# Patient Record
Sex: MALE | Race: WHITE | NOT HISPANIC OR LATINO | Employment: UNEMPLOYED | URBAN - METROPOLITAN AREA
[De-identification: names, ages, dates, MRNs, and addresses within clinical notes are randomized per-mention and may not be internally consistent; named-entity substitution may affect disease eponyms.]

---

## 2020-07-24 ENCOUNTER — APPOINTMENT (OUTPATIENT)
Dept: GENERAL RADIOLOGY | Facility: HOSPITAL | Age: 35
End: 2020-07-24

## 2020-07-24 ENCOUNTER — APPOINTMENT (OUTPATIENT)
Dept: CT IMAGING | Facility: HOSPITAL | Age: 35
End: 2020-07-24

## 2020-07-24 ENCOUNTER — HOSPITAL ENCOUNTER (EMERGENCY)
Facility: HOSPITAL | Age: 35
Discharge: LEFT BEFORE DISCHARGE | End: 2020-07-24
Attending: EMERGENCY MEDICINE | Admitting: EMERGENCY MEDICINE

## 2020-07-24 VITALS
RESPIRATION RATE: 18 BRPM | HEART RATE: 130 BPM | SYSTOLIC BLOOD PRESSURE: 128 MMHG | OXYGEN SATURATION: 97 % | DIASTOLIC BLOOD PRESSURE: 81 MMHG | TEMPERATURE: 101.3 F | WEIGHT: 210 LBS | HEIGHT: 69 IN | BODY MASS INDEX: 31.1 KG/M2

## 2020-07-24 DIAGNOSIS — R50.9 FEVER, UNSPECIFIED FEVER CAUSE: ICD-10-CM

## 2020-07-24 DIAGNOSIS — R00.0 TACHYCARDIA: ICD-10-CM

## 2020-07-24 DIAGNOSIS — L03.116 CELLULITIS OF LEFT HIP: ICD-10-CM

## 2020-07-24 DIAGNOSIS — E87.1 HYPONATREMIA: ICD-10-CM

## 2020-07-24 DIAGNOSIS — R73.9 HYPERGLYCEMIA: ICD-10-CM

## 2020-07-24 DIAGNOSIS — A41.9 SEPSIS, DUE TO UNSPECIFIED ORGANISM, UNSPECIFIED WHETHER ACUTE ORGAN DYSFUNCTION PRESENT (HCC): Primary | ICD-10-CM

## 2020-07-24 DIAGNOSIS — D72.829 LEUKOCYTOSIS, UNSPECIFIED TYPE: ICD-10-CM

## 2020-07-24 LAB
ALBUMIN SERPL-MCNC: 4.3 G/DL (ref 3.5–5.2)
ALBUMIN/GLOB SERPL: 1 G/DL
ALP SERPL-CCNC: 69 U/L (ref 39–117)
ALT SERPL W P-5'-P-CCNC: 14 U/L (ref 1–41)
ANION GAP SERPL CALCULATED.3IONS-SCNC: 11.9 MMOL/L (ref 5–15)
APTT PPP: 31.6 SECONDS (ref 22.7–35.4)
AST SERPL-CCNC: 10 U/L (ref 1–40)
BASOPHILS # BLD AUTO: 0.08 10*3/MM3 (ref 0–0.2)
BASOPHILS NFR BLD AUTO: 0.4 % (ref 0–1.5)
BILIRUB SERPL-MCNC: 1.3 MG/DL (ref 0–1.2)
BUN SERPL-MCNC: 10 MG/DL (ref 6–20)
BUN/CREAT SERPL: 10.5 (ref 7–25)
CALCIUM SPEC-SCNC: 9.8 MG/DL (ref 8.6–10.5)
CHLORIDE SERPL-SCNC: 96 MMOL/L (ref 98–107)
CO2 SERPL-SCNC: 23.1 MMOL/L (ref 22–29)
CREAT SERPL-MCNC: 0.95 MG/DL (ref 0.76–1.27)
CRP SERPL-MCNC: 13.77 MG/DL (ref 0–0.5)
D-LACTATE SERPL-SCNC: 1.5 MMOL/L (ref 0.5–2)
DEPRECATED RDW RBC AUTO: 39.6 FL (ref 37–54)
EOSINOPHIL # BLD AUTO: 0.03 10*3/MM3 (ref 0–0.4)
EOSINOPHIL NFR BLD AUTO: 0.1 % (ref 0.3–6.2)
ERYTHROCYTE [DISTWIDTH] IN BLOOD BY AUTOMATED COUNT: 12.6 % (ref 12.3–15.4)
GFR SERPL CREATININE-BSD FRML MDRD: 91 ML/MIN/1.73
GLOBULIN UR ELPH-MCNC: 4.2 GM/DL
GLUCOSE SERPL-MCNC: 120 MG/DL (ref 65–99)
HCT VFR BLD AUTO: 44.6 % (ref 37.5–51)
HGB BLD-MCNC: 15 G/DL (ref 13–17.7)
IMM GRANULOCYTES # BLD AUTO: 0.1 10*3/MM3 (ref 0–0.05)
IMM GRANULOCYTES NFR BLD AUTO: 0.5 % (ref 0–0.5)
INR PPP: 1.12 (ref 0.9–1.1)
LYMPHOCYTES # BLD AUTO: 3.54 10*3/MM3 (ref 0.7–3.1)
LYMPHOCYTES NFR BLD AUTO: 17.6 % (ref 19.6–45.3)
MCH RBC QN AUTO: 29 PG (ref 26.6–33)
MCHC RBC AUTO-ENTMCNC: 33.6 G/DL (ref 31.5–35.7)
MCV RBC AUTO: 86.3 FL (ref 79–97)
MONOCYTES # BLD AUTO: 1.79 10*3/MM3 (ref 0.1–0.9)
MONOCYTES NFR BLD AUTO: 8.9 % (ref 5–12)
NEUTROPHILS NFR BLD AUTO: 14.53 10*3/MM3 (ref 1.7–7)
NEUTROPHILS NFR BLD AUTO: 72.5 % (ref 42.7–76)
NRBC BLD AUTO-RTO: 0 /100 WBC (ref 0–0.2)
PLATELET # BLD AUTO: 331 10*3/MM3 (ref 140–450)
PMV BLD AUTO: 8.9 FL (ref 6–12)
POTASSIUM SERPL-SCNC: 3.5 MMOL/L (ref 3.5–5.2)
PROT SERPL-MCNC: 8.5 G/DL (ref 6–8.5)
PROTHROMBIN TIME: 14.2 SECONDS (ref 11.7–14.2)
RBC # BLD AUTO: 5.17 10*6/MM3 (ref 4.14–5.8)
SODIUM SERPL-SCNC: 131 MMOL/L (ref 136–145)
WBC # BLD AUTO: 20.07 10*3/MM3 (ref 3.4–10.8)

## 2020-07-24 PROCEDURE — 80053 COMPREHEN METABOLIC PANEL: CPT | Performed by: EMERGENCY MEDICINE

## 2020-07-24 PROCEDURE — 85730 THROMBOPLASTIN TIME PARTIAL: CPT | Performed by: EMERGENCY MEDICINE

## 2020-07-24 PROCEDURE — 87040 BLOOD CULTURE FOR BACTERIA: CPT | Performed by: EMERGENCY MEDICINE

## 2020-07-24 PROCEDURE — 99283 EMERGENCY DEPT VISIT LOW MDM: CPT

## 2020-07-24 PROCEDURE — 25010000002 ONDANSETRON PER 1 MG: Performed by: EMERGENCY MEDICINE

## 2020-07-24 PROCEDURE — 71045 X-RAY EXAM CHEST 1 VIEW: CPT

## 2020-07-24 PROCEDURE — 36415 COLL VENOUS BLD VENIPUNCTURE: CPT

## 2020-07-24 PROCEDURE — 25010000002 MEROPENEM PER 100 MG: Performed by: EMERGENCY MEDICINE

## 2020-07-24 PROCEDURE — 73700 CT LOWER EXTREMITY W/O DYE: CPT

## 2020-07-24 PROCEDURE — 83605 ASSAY OF LACTIC ACID: CPT | Performed by: EMERGENCY MEDICINE

## 2020-07-24 PROCEDURE — 85610 PROTHROMBIN TIME: CPT | Performed by: EMERGENCY MEDICINE

## 2020-07-24 PROCEDURE — 25010000002 MORPHINE PER 10 MG: Performed by: EMERGENCY MEDICINE

## 2020-07-24 PROCEDURE — 86140 C-REACTIVE PROTEIN: CPT | Performed by: EMERGENCY MEDICINE

## 2020-07-24 PROCEDURE — 96365 THER/PROPH/DIAG IV INF INIT: CPT

## 2020-07-24 PROCEDURE — 85025 COMPLETE CBC W/AUTO DIFF WBC: CPT | Performed by: EMERGENCY MEDICINE

## 2020-07-24 PROCEDURE — 96375 TX/PRO/DX INJ NEW DRUG ADDON: CPT

## 2020-07-24 RX ORDER — CLINDAMYCIN HYDROCHLORIDE 300 MG/1
300 CAPSULE ORAL 4 TIMES DAILY
Qty: 40 CAPSULE | Refills: 0 | Status: SHIPPED | OUTPATIENT
Start: 2020-07-24 | End: 2020-08-03

## 2020-07-24 RX ORDER — ONDANSETRON 2 MG/ML
4 INJECTION INTRAMUSCULAR; INTRAVENOUS ONCE
Status: COMPLETED | OUTPATIENT
Start: 2020-07-24 | End: 2020-07-24

## 2020-07-24 RX ORDER — ACETAMINOPHEN 500 MG
1000 TABLET ORAL ONCE
Status: DISCONTINUED | OUTPATIENT
Start: 2020-07-24 | End: 2020-07-24 | Stop reason: HOSPADM

## 2020-07-24 RX ORDER — ACETAMINOPHEN 500 MG
1000 TABLET ORAL ONCE
Status: COMPLETED | OUTPATIENT
Start: 2020-07-24 | End: 2020-07-24

## 2020-07-24 RX ORDER — CLINDAMYCIN PHOSPHATE 600 MG/50ML
600 INJECTION INTRAVENOUS ONCE
Status: DISCONTINUED | OUTPATIENT
Start: 2020-07-24 | End: 2020-07-24 | Stop reason: HOSPADM

## 2020-07-24 RX ORDER — MORPHINE SULFATE 2 MG/ML
4 INJECTION, SOLUTION INTRAMUSCULAR; INTRAVENOUS ONCE
Status: COMPLETED | OUTPATIENT
Start: 2020-07-24 | End: 2020-07-24

## 2020-07-24 RX ADMIN — MORPHINE SULFATE 4 MG: 2 INJECTION, SOLUTION INTRAMUSCULAR; INTRAVENOUS at 02:25

## 2020-07-24 RX ADMIN — SODIUM CHLORIDE 1000 ML: 9 INJECTION, SOLUTION INTRAVENOUS at 01:40

## 2020-07-24 RX ADMIN — ACETAMINOPHEN 1000 MG: 500 TABLET, FILM COATED ORAL at 01:32

## 2020-07-24 RX ADMIN — ONDANSETRON 4 MG: 2 INJECTION INTRAMUSCULAR; INTRAVENOUS at 02:23

## 2020-07-24 RX ADMIN — MEROPENEM 1 G: 1 INJECTION, POWDER, FOR SOLUTION INTRAVENOUS at 02:43

## 2020-07-24 NOTE — ED TRIAGE NOTES
Pt from home via PV for possible spider bite to L lateral thight. Pt reports it happened yesterday. States bite has worsened over night. Pt reports DC from wound is white/brown color    Pt and RN wearing mask at this time.

## 2020-07-24 NOTE — ED PROVIDER NOTES
EMERGENCY DEPARTMENT ENCOUNTER    Room Number:  16/16  Date of encounter:  7/24/2020  PCP: Provider, No Known    HPI:  Context: Ras Messina is a 35 y.o. male who presents to the ED c/o chief complaint of possible spider bite to left lateral thigh.  Patient reports first noticed a small red lesion on his lateral hip on Wednesday morning.  Area has rapidly progressed in size, began to have purulent drainage.  Patient denies any cuts or scrapes, no unusual skin lesions prior to onset of symptoms.  Patient denies any fevers, did have night sweats, no shakes or chills.  No vomiting or diarrhea.  Patient denies any history of cellulitis, abscess, or MRSA in the past. No history of diabetes, no history of chronic kidney disease, no history of HIV or immune suppression.  The patient is not on chronic steroids.  The patient is not being treated for cancer.  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    MEDICAL HISTORY REVIEW  Reviewed in EPIC    PAST MEDICAL HISTORY  Active Ambulatory Problems     Diagnosis Date Noted   • No Active Ambulatory Problems     Resolved Ambulatory Problems     Diagnosis Date Noted   • No Resolved Ambulatory Problems     No Additional Past Medical History       PAST SURGICAL HISTORY  History reviewed. No pertinent surgical history.    FAMILY HISTORY  History reviewed. No pertinent family history.    SOCIAL HISTORY  Social History     Socioeconomic History   • Marital status:      Spouse name: Not on file   • Number of children: Not on file   • Years of education: Not on file   • Highest education level: Not on file   Tobacco Use   • Smoking status: Current Every Day Smoker     Packs/day: 1.00     Types: Cigarettes   • Smokeless tobacco: Never Used   Substance and Sexual Activity   • Alcohol use: Never     Frequency: Never   • Drug use: Not Currently       ALLERGIES  Amoxicillin    The patient's  allergies have been reviewed    REVIEW OF SYSTEMS  All systems reviewed and negative except for those discussed in HPI.     PHYSICAL EXAM  I have reviewed the triage vital signs and nursing notes.  ED Triage Vitals [07/24/20 0025]   Temp Heart Rate Resp BP SpO2   (!) 101.3 °F (38.5 °C) (!) 139 18 129/81 97 %      Temp src Heart Rate Source Patient Position BP Location FiO2 (%)   Tympanic Monitor -- -- --     GENERAL: Mild distress, appears in pain  HENT: NCAT, PERRL, Nares patent  EYES: no scleral icterus  NECK: trachea midline, no ROM limitations  CV: regular rhythm, tachycardic rate  RESPIRATORY: normal effort  ABDOMEN: soft  : deferred  MUSCULOSKELETAL: no deformity.  Left lateral hip.  Large abscess with surrounding cellulitis, slight fluctuance, signs of previous purulent drainage but no purulent drainage at present, no subcutaneous emphysema, no pain past border of erythema  NEURO: alert, moves all extremities, follows commands  SKIN: warm, dry    LAB RESULTS  Recent Results (from the past 24 hour(s))   Comprehensive Metabolic Panel    Collection Time: 07/24/20  1:28 AM   Result Value Ref Range    Glucose 120 (H) 65 - 99 mg/dL    BUN 10 6 - 20 mg/dL    Creatinine 0.95 0.76 - 1.27 mg/dL    Sodium 131 (L) 136 - 145 mmol/L    Potassium 3.5 3.5 - 5.2 mmol/L    Chloride 96 (L) 98 - 107 mmol/L    CO2 23.1 22.0 - 29.0 mmol/L    Calcium 9.8 8.6 - 10.5 mg/dL    Total Protein 8.5 6.0 - 8.5 g/dL    Albumin 4.30 3.50 - 5.20 g/dL    ALT (SGPT) 14 1 - 41 U/L    AST (SGOT) 10 1 - 40 U/L    Alkaline Phosphatase 69 39 - 117 U/L    Total Bilirubin 1.3 (H) 0.0 - 1.2 mg/dL    eGFR Non African Amer 91 >60 mL/min/1.73    Globulin 4.2 gm/dL    A/G Ratio 1.0 g/dL    BUN/Creatinine Ratio 10.5 7.0 - 25.0    Anion Gap 11.9 5.0 - 15.0 mmol/L   Protime-INR    Collection Time: 07/24/20  1:28 AM   Result Value Ref Range    Protime 14.2 11.7 - 14.2 Seconds    INR 1.12 (H) 0.90 - 1.10   aPTT    Collection Time: 07/24/20  1:28 AM   Result  Value Ref Range    PTT 31.6 22.7 - 35.4 seconds   Lactic Acid, Plasma    Collection Time: 07/24/20  1:28 AM   Result Value Ref Range    Lactate 1.5 0.5 - 2.0 mmol/L   CBC Auto Differential    Collection Time: 07/24/20  1:28 AM   Result Value Ref Range    WBC 20.07 (H) 3.40 - 10.80 10*3/mm3    RBC 5.17 4.14 - 5.80 10*6/mm3    Hemoglobin 15.0 13.0 - 17.7 g/dL    Hematocrit 44.6 37.5 - 51.0 %    MCV 86.3 79.0 - 97.0 fL    MCH 29.0 26.6 - 33.0 pg    MCHC 33.6 31.5 - 35.7 g/dL    RDW 12.6 12.3 - 15.4 %    RDW-SD 39.6 37.0 - 54.0 fl    MPV 8.9 6.0 - 12.0 fL    Platelets 331 140 - 450 10*3/mm3    Neutrophil % 72.5 42.7 - 76.0 %    Lymphocyte % 17.6 (L) 19.6 - 45.3 %    Monocyte % 8.9 5.0 - 12.0 %    Eosinophil % 0.1 (L) 0.3 - 6.2 %    Basophil % 0.4 0.0 - 1.5 %    Immature Grans % 0.5 0.0 - 0.5 %    Neutrophils, Absolute 14.53 (H) 1.70 - 7.00 10*3/mm3    Lymphocytes, Absolute 3.54 (H) 0.70 - 3.10 10*3/mm3    Monocytes, Absolute 1.79 (H) 0.10 - 0.90 10*3/mm3    Eosinophils, Absolute 0.03 0.00 - 0.40 10*3/mm3    Basophils, Absolute 0.08 0.00 - 0.20 10*3/mm3    Immature Grans, Absolute 0.10 (H) 0.00 - 0.05 10*3/mm3    nRBC 0.0 0.0 - 0.2 /100 WBC       I ordered the above labs and reviewed the results.    RADIOLOGY  Xr Chest 1 View    Result Date: 7/24/2020  PORTABLE CHEST 07/24/2020 AT 1:03 AM  CLINICAL HISTORY: Fever.  The lungs are well-expanded and appear free of infiltrates. There are no pleural effusions. The cardiomediastinal silhouette is unremarkable.  IMPRESSIONS: Normal portable chest x-ray.  This report was finalized on 7/24/2020 1:27 AM by Dr. Enrique Hernandez M.D.      Ct Lower Extremity Left Without Contrast    Result Date: 7/24/2020  CT LOWER EXTREMITY LEFT WO CONTRAST-  CLINICAL HISTORY: Cellulitis lateral to the left hip extending into the upper thigh.  TECHNIQUE: Spiral CT images were obtained through the pelvis and hips and upper half of the thighs  Radiation dose reduction techniques were utilized, including  automated exposure control and exposure modulation based on body size.  COMPARISON: None  FINDINGS: There is prominent injection of the subcutaneous fat of the lateral aspect of the left thigh extending from the level of the left greater trochanter inferiorly consistent with cellulitis. The edema extends approximately 5 cm deep to the skin surface. The is skin thickening there is no definite evidence of an abscess on these noncontrast enhanced images. There is no evidence of myositis. Localized edema is also evident in the subcutaneous fat of the right inguinal region consistent with cellulitis. The visualized small and large bowel are unremarkable.      CT findings consistent with cellulitis in the lateral aspect of the proximal left thigh and also in the abdominal wall the right inguinal region. There is no definite evidence of an abscess on these noncontrast enhanced images.  This report was finalized on 7/24/2020 2:43 AM by Dr. Enrique Hernandez M.D.        I ordered the above noted radiological studies. I reviewed the images and results. I agree with the radiologist interpretation.    PROCEDURES  Procedures    MEDICATIONS GIVEN IN ER  Medications   acetaminophen (TYLENOL) tablet 1,000 mg (has no administration in time range)   vancomycin 2000 mg/500 mL 0.9% NS IVPB ([facility]) (has no administration in time range)   clindamycin (CLEOCIN) 600 mg in dextrose 5% 50 mL IVPB (premix) (has no administration in time range)   acetaminophen (TYLENOL) tablet 1,000 mg (1,000 mg Oral Given 7/24/20 0132)   sodium chloride 0.9 % bolus 1,000 mL (1,000 mL Intravenous New Bag 7/24/20 0140)   morphine injection 4 mg (4 mg Intravenous Given 7/24/20 0225)   ondansetron (ZOFRAN) injection 4 mg (4 mg Intravenous Given 7/24/20 0223)   meropenem (MERREM) 1 g/100 mL 0.9% NS VTB (mbp) (1 g Intravenous New Bag 7/24/20 0243)       PROGRESS, DATA ANALYSIS, CONSULTS, AND MEDICAL DECISION MAKING  A complete history and physical exam have been  performed.  All available laboratory and imaging results have been reviewed by myself prior to disposition.  Patient was placed in face mask in first look. Patient was wearing facemask when I entered the room and throughout our encounter. I wore full protective equipment throughout this patient encounter including a face mask, and gloves. Hand hygiene was performed before donning protective equipment and after removal when leaving the room.  MDM  After the initial H&P, I discussed pertinent information from history and physical exam with patient/family.  Discussed differential diagnosis.  Discussed plan for ED evaluation/work-up/treatment.  All questions answered.  Patient/family is agreeable with plan.  ED Course as of Jul 24 0314 Fri Jul 24, 2020   0130 Patient febrile, extremely tachycardic, wound is appears as simple cellulitis but given rapidity of onset, severity of infection, concern for necrotizing fasciitis.  Obtaining work-up including blood cultures and lactic acid, CT imaging of the extremity, plan for broad-spectrum empiric antibiotics.    [JG]   0222 Consulted with pharmacy, discussed concern for possible necrotizing fasciitis, allergy to amoxicillin.  They recommend meropenem, vancomycin, clindamycin.    [JG]   0307 Again discussed with patient, patient is adamant in his refusal for admission at this time, states he has business they has to take care of first thing in the morning but does report that he will return to the hospital for hospitalization afterwards.  Patient is leaving AMA as dictated below.    [JG]   0307 AMA Note:  The patient presented to the emergency department today I evaluated and treated the patient as indicated in the provider note.  I discussed the results are available at this time and informed the patient that further diagnostic testing and treatment is recommended before the hospital and management is complete.  They expressed understanding of this discussion but have  elected to leave the emergency department before completion of this process.  I informed him of the risk, benefits, and alternative plans of care that could be considered, and they expressed understanding of these risk including decompensation of current symptoms, new symptoms, and even possibility of permanent death or disability.  Patient is responding appropriately and appears decisional.  I have encouraged them to return to immediately if they wish to proceed with care.  I have also encouraged them to return to the emergency department if they experience a return or worsening of their symptoms and to follow-up with her physicians as soon as possible.        [JG]   0312 Patient reassessed.  Discussed ED findings, differential diagnosis, and the need for admission for evaluation/treatment.  Patient is stating he must be discharged from the emergency department, he has business he absolutely has to take care of this morning.  I discussed with him that I am concerned that he has a life-threatening emergency, could result in loss of limb, septic shock, permanent disability, death, or other unforeseen conditions.  I discussed with the patient that he has a high likelihood of decompensation and will likely die without further treatment, I discussed with him that this cannot be safely managed outpatient.  I spoke with the patient's mother over the telephone at his request and discussed the same with her, after which she discussed with the patient.  Patient is thinking it over and will reevaluate.        [JG]      ED Course User Index  [JG] Ras Damico MD       AS OF 03:14 VITALS:    BP - 128/81  HR - (!) 130  TEMP - (!) 101.3 °F (38.5 °C) (Tympanic)  O2 SATS - 97%    DIAGNOSIS  Final diagnoses:   Sepsis, due to unspecified organism, unspecified whether acute organ dysfunction present (CMS/[facility])   Cellulitis of left hip   Fever, unspecified fever cause   Tachycardia   Leukocytosis, unspecified type   Hyponatremia    Hyperglycemia         DISPOSITION  AMA as documented above    FOLLOW-UP  Your PCP    Go today  for further management of your severe life-threatening emergency    [facility] Emergency Department  [address] [facility] [address] [phone]  Go today  To be admitted to the hospital to further treat your severe life-threatening emergency         Medication List      New Prescriptions    clindamycin 300 MG capsule  Commonly known as:  CLEOCIN  Take 1 capsule by mouth 4 (Four) Times a Day for 10 days.           Ras Damico MD  07/24/20 0313

## 2020-07-29 LAB
BACTERIA SPEC AEROBE CULT: NORMAL
BACTERIA SPEC AEROBE CULT: NORMAL